# Patient Record
Sex: MALE | Race: WHITE | NOT HISPANIC OR LATINO | URBAN - NONMETROPOLITAN AREA
[De-identification: names, ages, dates, MRNs, and addresses within clinical notes are randomized per-mention and may not be internally consistent; named-entity substitution may affect disease eponyms.]

---

## 2024-09-10 ENCOUNTER — PREP FOR SURGERY (OUTPATIENT)
Dept: OTHER | Facility: HOSPITAL | Age: 29
End: 2024-09-10
Payer: COMMERCIAL

## 2024-09-10 ENCOUNTER — OFFICE VISIT (OUTPATIENT)
Dept: CARDIOLOGY | Facility: CLINIC | Age: 29
End: 2024-09-10
Payer: COMMERCIAL

## 2024-09-10 VITALS
HEART RATE: 54 BPM | HEIGHT: 70 IN | OXYGEN SATURATION: 98 % | SYSTOLIC BLOOD PRESSURE: 138 MMHG | DIASTOLIC BLOOD PRESSURE: 66 MMHG

## 2024-09-10 DIAGNOSIS — I48.0 PAROXYSMAL ATRIAL FIBRILLATION: Primary | ICD-10-CM

## 2024-09-10 PROCEDURE — 99204 OFFICE O/P NEW MOD 45 MIN: CPT | Performed by: STUDENT IN AN ORGANIZED HEALTH CARE EDUCATION/TRAINING PROGRAM

## 2024-09-10 PROCEDURE — 93000 ELECTROCARDIOGRAM COMPLETE: CPT | Performed by: STUDENT IN AN ORGANIZED HEALTH CARE EDUCATION/TRAINING PROGRAM

## 2024-09-10 RX ORDER — SODIUM CHLORIDE 0.9 % (FLUSH) 0.9 %
10 SYRINGE (ML) INJECTION EVERY 12 HOURS SCHEDULED
OUTPATIENT
Start: 2024-09-10

## 2024-09-10 RX ORDER — SODIUM CHLORIDE 0.9 % (FLUSH) 0.9 %
10 SYRINGE (ML) INJECTION AS NEEDED
OUTPATIENT
Start: 2024-09-10

## 2024-09-10 RX ORDER — FAMOTIDINE 40 MG/1
1 TABLET, FILM COATED ORAL DAILY
COMMUNITY

## 2024-09-10 RX ORDER — SODIUM CHLORIDE 9 MG/ML
40 INJECTION, SOLUTION INTRAVENOUS AS NEEDED
OUTPATIENT
Start: 2024-09-10

## 2024-09-10 RX ORDER — CARVEDILOL 6.25 MG/1
TABLET ORAL
COMMUNITY

## 2024-09-10 RX ORDER — DRONEDARONE 400 MG/1
TABLET, FILM COATED ORAL
COMMUNITY

## 2024-09-10 RX ORDER — ASPIRIN 81 MG/1
1 TABLET ORAL DAILY
COMMUNITY

## 2024-09-10 RX ORDER — LANSOPRAZOLE 30 MG/1
1 CAPSULE, DELAYED RELEASE ORAL DAILY
COMMUNITY

## 2024-09-10 NOTE — PROGRESS NOTES
"EP NEW PATIENT VISIT    Chief Complaint  Atrial Fibrillation    Subjective        History of Present Illness    EP Problems:  1.  Paroxysmal atrial fibrillation    Medical problems:  1.  GERD with Calixto's esophagus    Srikanth Fonseca is a 29 y.o. male with problem list as above who presents to the clinic for evaluation of paroxysmal atrial fibrillation.  He was first diagnosed with atrial fibrillation in April.  He underwent YAEL and cardioversion for the treatment of persistent symptoms.  He has been treated with Multaq for rhythm control.  He states that he has not had any breakthrough episodes since starting the Multaq and has been doing okay with this.  Given these findings, he has been referred to EP for further evaluation.    Objective   Vital Signs:  /66   Pulse 54   Ht 177.8 cm (70\")   SpO2 98%   There is no height or weight on file to calculate BMI.      Physical Exam  Vitals reviewed.   Constitutional:       Appearance: Normal appearance.   Cardiovascular:      Rate and Rhythm: Normal rate and regular rhythm.      Pulses: Normal pulses.      Heart sounds: Normal heart sounds.   Pulmonary:      Effort: Pulmonary effort is normal.      Breath sounds: Normal breath sounds.   Musculoskeletal:         General: No swelling.   Neurological:      Mental Status: He is alert and oriented to person, place, and time.   Psychiatric:         Mood and Affect: Mood normal.         Judgment: Judgment normal.        Result Review :  The following data was reviewed by: Jillian Abraham MD on 09/10/2024:    [id number] SCORE   [id number] Score: 0 (9/10/2024  2:34 PM)          ECG 12 Lead    Date/Time: 9/10/2024 2:35 PM  Performed by: Jillian Abraham MD    Authorized by: Jillian Abraham MD  Comparison: not compared with previous ECG   Previous ECG: no previous ECG available  Rhythm: sinus rhythm  Rate: bradycardic  BPM: 54  Conduction: conduction normal  QRS axis: normal  Other findings: U wave    Clinical impression: " non-specific ECG              Assessment and Plan   Diagnoses and all orders for this visit:    1. Paroxysmal atrial fibrillation (Primary)    Other orders  -     ECG 12 Lead        Srikanth Fonseca is a 29 y.o. male with problem list as above who presents to the clinic for evaluation of paroxysmal atrial fibrillation.  He has findings consistent with symptomatic paroxysmal atrial fibrillation.  We discussed available treatment options for him including continued multidrug use.  Per the most recent guidelines, her young and healthy patients with recurrent paroxysmal atrial fibrillation, ablation is considered first-line therapy.  Despite this, given that he has had a good response to Multaq, it may be reasonable to consider continuing this medication long-term so long as it remains effective.  Risks of both the ablation (bleeding, stroke, MI,, conduction system injury, and ultimately death) as well as long-term risks of medication complications were discussed.  After risk-benefit discussion, he would prefer to have an ablation performed but would rather wait approximately 6 months to have this done.    Plan:  -Schedule for atrial fibrillation ablation in 6 months per patient request  -Continue Multaq for now  -Will need to start anticoagulation with apixaban 3 weeks prior to the procedure  -Advised to call us should he have worsening breakthrough episodes             I spent 53 minutes caring for this patient on this date of service (excluding time spent on ECG interpretation and documentation). This time includes time spent by me in the following activities:preparing for the visit, reviewing tests, obtaining and/or reviewing a separately obtained history, performing a medically appropriate examination and/or evaluation , counseling and educating the patient/family/caregiver, and documenting information in the medical record    Follow Up   No follow-ups on file.  Patient was given instructions and counseling  regarding his condition or for health maintenance advice. Please see specific information pulled into the AVS if appropriate.     Part of this note may be an electronic transcription/translation of spoken language to printed text using the Dragon Dictation System.

## 2025-02-21 ENCOUNTER — TELEPHONE (OUTPATIENT)
Dept: CARDIOLOGY | Facility: CLINIC | Age: 30
End: 2025-02-21
Payer: COMMERCIAL

## 2025-02-21 NOTE — TELEPHONE ENCOUNTER
Spoke with Mr. Fonseca's wife in regard to scheduled ablation. She stated that they would like to cancel his procedure at this time. He will follow up in clinic, as scheduled, to discuss if he would like to proceed with an ablation in the future.     Ofelia Galvez RN

## 2025-03-20 ENCOUNTER — OFFICE VISIT (OUTPATIENT)
Dept: CARDIOLOGY | Facility: CLINIC | Age: 30
End: 2025-03-20
Payer: COMMERCIAL

## 2025-03-20 ENCOUNTER — LAB (OUTPATIENT)
Dept: LAB | Facility: HOSPITAL | Age: 30
End: 2025-03-20
Payer: COMMERCIAL

## 2025-03-20 VITALS
HEIGHT: 70 IN | SYSTOLIC BLOOD PRESSURE: 134 MMHG | DIASTOLIC BLOOD PRESSURE: 94 MMHG | WEIGHT: 228.6 LBS | HEART RATE: 59 BPM | BODY MASS INDEX: 32.73 KG/M2 | OXYGEN SATURATION: 98 %

## 2025-03-20 DIAGNOSIS — I48.0 PAROXYSMAL ATRIAL FIBRILLATION: Primary | ICD-10-CM

## 2025-03-20 DIAGNOSIS — I48.0 PAROXYSMAL ATRIAL FIBRILLATION: ICD-10-CM

## 2025-03-20 LAB
ALBUMIN SERPL-MCNC: 4.4 G/DL (ref 3.5–5.2)
ALBUMIN/GLOB SERPL: 1.5 G/DL
ALP SERPL-CCNC: 98 U/L (ref 39–117)
ALT SERPL W P-5'-P-CCNC: 103 U/L (ref 1–41)
ANION GAP SERPL CALCULATED.3IONS-SCNC: 12 MMOL/L (ref 5–15)
AST SERPL-CCNC: 59 U/L (ref 1–40)
BILIRUB SERPL-MCNC: 0.9 MG/DL (ref 0–1.2)
BUN SERPL-MCNC: 5 MG/DL (ref 6–20)
BUN/CREAT SERPL: 7.2 (ref 7–25)
CALCIUM SPEC-SCNC: 10.4 MG/DL (ref 8.6–10.5)
CHLORIDE SERPL-SCNC: 101 MMOL/L (ref 98–107)
CO2 SERPL-SCNC: 26 MMOL/L (ref 22–29)
CREAT SERPL-MCNC: 0.69 MG/DL (ref 0.76–1.27)
DEPRECATED RDW RBC AUTO: 41.1 FL (ref 37–54)
EGFRCR SERPLBLD CKD-EPI 2021: 128.5 ML/MIN/1.73
ERYTHROCYTE [DISTWIDTH] IN BLOOD BY AUTOMATED COUNT: 12 % (ref 12.3–15.4)
GLOBULIN UR ELPH-MCNC: 2.9 GM/DL
GLUCOSE SERPL-MCNC: 118 MG/DL (ref 65–99)
HCT VFR BLD AUTO: 45.8 % (ref 37.5–51)
HGB BLD-MCNC: 16.9 G/DL (ref 13–17.7)
MAGNESIUM SERPL-MCNC: 2 MG/DL (ref 1.6–2.6)
MCH RBC QN AUTO: 34.7 PG (ref 26.6–33)
MCHC RBC AUTO-ENTMCNC: 36.9 G/DL (ref 31.5–35.7)
MCV RBC AUTO: 94 FL (ref 79–97)
PLATELET # BLD AUTO: 242 10*3/MM3 (ref 140–450)
PMV BLD AUTO: 10.2 FL (ref 6–12)
POTASSIUM SERPL-SCNC: 4.4 MMOL/L (ref 3.5–5.2)
PROT SERPL-MCNC: 7.3 G/DL (ref 6–8.5)
RBC # BLD AUTO: 4.87 10*6/MM3 (ref 4.14–5.8)
SODIUM SERPL-SCNC: 139 MMOL/L (ref 136–145)
WBC NRBC COR # BLD AUTO: 13.85 10*3/MM3 (ref 3.4–10.8)

## 2025-03-20 PROCEDURE — 80053 COMPREHEN METABOLIC PANEL: CPT

## 2025-03-20 PROCEDURE — 36415 COLL VENOUS BLD VENIPUNCTURE: CPT

## 2025-03-20 PROCEDURE — 83735 ASSAY OF MAGNESIUM: CPT

## 2025-03-20 PROCEDURE — 85027 COMPLETE CBC AUTOMATED: CPT

## 2025-03-20 NOTE — PROGRESS NOTES
"EP FOLLOW UP PATIENT VISIT    Chief Complaint  Atrial Fibrillation    Subjective        Atrial Fibrillation  Past medical history includes atrial fibrillation.       EP Problems:  1.  Paroxysmal atrial fibrillation     Medical problems:  1.  GERD with Calixto's esophagus    Mr. Fonseca is a 30-year-old male who presents to the clinic following up for paroxysmal atrial fibrillation. He was diagnosed with this in April of last year and had a YAEL cardioversion. He has been rhythm controlled with Multaq and was scheduled for an ablation on March 17.    Patient states that he is doing well on Multaq and carvedilol. He denies any recurrence of atrial fibrillation since starting the medication. He does state that he has some diarrhea from this states that it is improved lately. He states that his heart rates have been well-controlled and he is tolerating carvedilol well. With his symptoms improved on Multaq and carvedilol he decided to cancel the ablation for now and forego until next year to reduce the amount of medical bills he will have to pay this year. He was taking Eliquis but stopped this after deciding to cancel his ablation.    Objective   Vital Signs:  /94   Pulse 59   Ht 177.8 cm (70\")   Wt 104 kg (228 lb 9.6 oz)   SpO2 98%   BMI 32.80 kg/m²   Estimated body mass index is 32.8 kg/m² as calculated from the following:    Height as of this encounter: 177.8 cm (70\").    Weight as of this encounter: 104 kg (228 lb 9.6 oz).      Physical Exam  Vitals reviewed.   Constitutional:       Appearance: Normal appearance. He is obese.   Cardiovascular:      Rate and Rhythm: Regular rhythm. Bradycardia present.      Pulses: Normal pulses.           Radial pulses are 2+ on the right side and 2+ on the left side.        Posterior tibial pulses are 2+ on the right side and 2+ on the left side.      Heart sounds: Normal heart sounds.   Pulmonary:      Effort: Pulmonary effort is normal.      Breath sounds: Normal breath " sounds.   Musculoskeletal:      Right lower leg: No edema.      Left lower leg: No edema.   Skin:     General: Skin is warm and dry.   Neurological:      Mental Status: He is alert.              Result Review :  The following data was reviewed by: MARY Neri on 03/20/2025:      [id number] SCORE   [id number] Score: 0 (3/20/2025  1:09 PM)        ECG 12 Lead    Date/Time: 3/20/2025 1:38 PM  Performed by: Hussain Mercado APRN    Authorized by: Hussain Mercado APRN  Comparison: compared with previous ECG from 9/10/2024  Similar to previous ECG  Comparison to previous ECG: Nonspecific T wave abnormality now present  Rhythm: sinus bradycardia  Rate: normal  BPM: 59  QRS axis: normal  Other findings: T wave abnormality    Clinical impression: abnormal EKG              Assessment and Plan   Diagnoses and all orders for this visit:    1. Paroxysmal atrial fibrillation (Primary)  -     Comprehensive Metabolic Panel; Future  -     CBC (No Diff); Future  -     Magnesium; Future  -     ECG 12 Lead            Plan:  - Patient states that he would like to have his ablation done next year.  - Given his [id number] score of 0 patient may stop Eliquis. Will need to restart 3 weeks prior to ablation next year.  - Rhythm has been well-controlled. Continue Multaq.  - Patient states he has not had labs in a couple years that he is aware of. Agreeable to get CBC, CMP. and magnesium in the lab today.  - Rates have been well-controlled in the 50s and 60s. Continue carvedilol.           Follow Up   Return in about 6 months (around 9/20/2025).  Patient was given instructions and counseling regarding his condition or for health maintenance advice. Please see specific information pulled into the AVS if appropriate.     Part of this note may be an electronic transcription/translation of spoken language to printed text using the Dragon Dictation System.

## 2025-03-21 ENCOUNTER — RESULTS FOLLOW-UP (OUTPATIENT)
Dept: CARDIOLOGY | Facility: CLINIC | Age: 30
End: 2025-03-21
Payer: COMMERCIAL

## 2025-03-24 NOTE — TELEPHONE ENCOUNTER
Informed patient that his liver enzymes have gone up since his labs last year. This can be an adverse effect of Multaq.    Patient would like to remain on Multaq if possible because he states that he has had good results on this medication. He plans to have an ablation next year.    This is reasonable with close follow up of labs. If liver enzymes continue to increase, will stop Multaq and start flecainide. Will recheck labs in 3 months.    Patient would like to have them drawn at [facility] in [place], [place].

## 2025-07-09 DIAGNOSIS — I48.0 PAROXYSMAL ATRIAL FIBRILLATION: Primary | ICD-10-CM
